# Patient Record
Sex: MALE | Race: WHITE | Employment: UNEMPLOYED | URBAN - METROPOLITAN AREA
[De-identification: names, ages, dates, MRNs, and addresses within clinical notes are randomized per-mention and may not be internally consistent; named-entity substitution may affect disease eponyms.]

---
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
## 2023-02-19 ENCOUNTER — HOSPITAL ENCOUNTER (EMERGENCY)
Facility: CLINIC | Age: 18
Discharge: HOME | End: 2023-02-19
Attending: SPECIALIST
Payer: MEDICAID

## 2023-02-19 VITALS
HEART RATE: 100 BPM | DIASTOLIC BLOOD PRESSURE: 104 MMHG | WEIGHT: 315 LBS | SYSTOLIC BLOOD PRESSURE: 150 MMHG | TEMPERATURE: 98.3 F | OXYGEN SATURATION: 100 % | RESPIRATION RATE: 18 BRPM | BODY MASS INDEX: 56.12 KG/M2

## 2023-02-19 DIAGNOSIS — M79.604 RIGHT LEG PAIN: Primary | ICD-10-CM

## 2023-02-19 PROCEDURE — 99282 EMERGENCY DEPT VISIT SF MDM: CPT

## 2023-02-19 ASSESSMENT — PAIN DESCRIPTION - DESCRIPTORS: DESCRIPTORS: SORE

## 2023-02-19 ASSESSMENT — PAIN DESCRIPTION - PAIN TYPE: TYPE: ACUTE PAIN

## 2023-02-19 ASSESSMENT — PAIN - FUNCTIONAL ASSESSMENT: PAIN_FUNCTIONAL_ASSESSMENT: 0-10

## 2023-02-19 ASSESSMENT — PAIN DESCRIPTION - LOCATION: LOCATION: LEG

## 2023-02-19 ASSESSMENT — PAIN DESCRIPTION - FREQUENCY: FREQUENCY: INTERMITTENT

## 2023-02-19 ASSESSMENT — ENCOUNTER SYMPTOMS: SHORTNESS OF BREATH: 0

## 2023-02-19 ASSESSMENT — PAIN SCALES - GENERAL: PAINLEVEL_OUTOF10: 2

## 2023-02-19 ASSESSMENT — PAIN DESCRIPTION - ORIENTATION: ORIENTATION: RIGHT

## 2023-02-19 NOTE — ED NOTES
Pt. Ambulatory to room # 11 from waiting area, gait steady. Pt. C/o right calf pain only when he presses the area. Pt. Denies cp or sob. Pt. Alert and oriented x4. RR equal and non labored. NaD  Noted. Call light within reach.       Emmanuel Kwong RN  02/19/23 9808

## 2023-02-19 NOTE — ED PROVIDER NOTES
[facility]  [facility]  Phone: [phone]      Pt Name: Emmy Heard  MRN: [id number]  [place] 2005  Date of evaluation: 2/19/2023      CHIEF COMPLAINT       Chief Complaint   Patient presents with    Leg Pain     Back of right calf started yesterday. ? injury         HISTORY OF PRESENT ILLNESS    Emmy Heard is a 21 y.o. male who presents   Chief Complaint   Patient presents with    Leg Pain     Back of right calf started yesterday. ? injury   . 53-year-old male patient presents to the emergency department complaining of pain on the medial aspect of the right lower leg since 1 day prior to arrival.  Patient grades pain is only 2 out of 10 in intensity worse upon palpation in the region. He denies any swelling in the calf pain and denies any chest pain, shortness of breath, fever or chills. He is unsure about noticing any swelling in the right lower leg but has noticed slight redness at the site of the tenderness. No history of fever or chills. Patient is able to ambulate freely without any pain or discomfort. No history of fall or injuries. Patient has taken acetaminophen 1000 mg orally with some relief. Patient states he has habit of keeping himself in the calf area with the other leg. He denies any recent long travels or prolonged immobilization and no history of DVT or PE in the past.  Patient states he is not very active and thus is concerned about blood clot in the leg. REVIEW OF SYSTEMS     Review of Systems   Constitutional:  Negative for chills and fever. Respiratory:  Negative for shortness of breath. Cardiovascular:  Negative for chest pain and palpitations. Musculoskeletal:  Positive for myalgias. Neurological:  Negative for weakness and numbness. All other systems reviewed and are negative. PAST MEDICAL HISTORY    has a past medical history of Depression and Hypertension.     SURGICAL HISTORY      has no past surgical history on file. CURRENT MEDICATIONS       Previous Medications    ATENOLOL PO    Take by mouth    ESCITALOPRAM (LEXAPRO) 10 MG TABLET    Take 10 mg by mouth daily    IBUPROFEN (ADVIL;MOTRIN) 600 MG TABLET    Take 1 tablet by mouth every 6 hours as needed for Pain    METOPROLOL SUCCINATE (TOPROL XL) 50 MG EXTENDED RELEASE TABLET    Take 50 mg by mouth daily    TRAZODONE HCL PO    Take by mouth       ALLERGIES     is allergic to cefdinir and clindamycin/lincomycin. FAMILY HISTORY     has no family status information on file. family history is not on file. SOCIAL HISTORY      reports that he has never smoked. He has never used smokeless tobacco. He reports that he does not currently use alcohol. He reports that he does not use drugs. PHYSICAL EXAM     INITIAL VITALS:  weight is 172.4 kg (380 lb) (abnormal). His temperature is 98.3 °F (36.8 °C). His blood pressure is 150/104 (abnormal) and his pulse is 100. His respiration is 18 and oxygen saturation is 100%. Physical Exam  Vitals and nursing note reviewed. Constitutional:       General: He is not in acute distress. Appearance: He is well-developed. HENT:      Head: Normocephalic and atraumatic. Nose: Nose normal.   Eyes:      Extraocular Movements: Extraocular movements intact. Pupils: Pupils are equal, round, and reactive to light. Cardiovascular:      Rate and Rhythm: Normal rate and regular rhythm. Heart sounds: Normal heart sounds. No murmur heard. Pulmonary:      Effort: Pulmonary effort is normal. No respiratory distress. Breath sounds: Normal breath sounds. Abdominal:      General: Bowel sounds are normal. There is no distension. Palpations: Abdomen is soft. Tenderness: There is no abdominal tenderness. Musculoskeletal:      Cervical back: Normal range of motion and neck supple. Left lower leg: No swelling. No edema.       Comments: Patient has mild tenderness upon palpation on the medial aspect of the right lower leg. There is no edema in the calf and there is no tenderness in the right calf. Dimitry Roula' sign is negative. There is no evidence of erythema whatsoever. Neurovascular examination is intact distally. Achilles tendon is normal and intact. Gait is normal.   Skin:     General: Skin is warm and dry. Neurological:      General: No focal deficit present. Mental Status: He is alert and oriented to person, place, and time. Psychiatric:         Behavior: Behavior normal.         Thought Content: Thought content normal.         DIFFERENTIAL DIAGNOSIS/ MDM:     Differential diagnosis considered: Right lower leg probably musculoskeletal in origin. No evidence of cellulitis or Achilles tendon rupture. There is no calf tenderness, Homans' sign negative. Clinically no evidence of DVT. DIAGNOSTIC RESULTS     EKG: All EKG's are interpreted by the Emergency Department Physician who either signs or Co-signs this chart in the absence of a cardiologist.    None obtained    RADIOLOGY:   I reviewed the radiologist interpretations:  No orders to display       No results found. LABS:  Labs Reviewed - No data to display        EMERGENCY DEPARTMENT COURSE:   Vitals:    Vitals:    02/19/23 0347   BP: (!) 150/104   Pulse: 100   Resp: 18   Temp: 98.3 °F (36.8 °C)   SpO2: 100%   Weight: (!) 172.4 kg (380 lb)     -------------------------  BP: (!) 150/104, Temp: 98.3 °F (36.8 °C), Heart Rate: 100, Resp: 18    No orders of the defined types were placed in this encounter. Patient is ambulatory in the hallway and does not appear to be in any pain or distress. He was reassured and discharged home with instructions to take Tylenol and/or ibuprofen as needed for the pain, elevate the right lower leg, follow-up with PCP, return if symptoms worsen or if he develops any new symptoms including pain or swelling in the right calf, chest pain, shortness of breath, fever or chills.   He is advised to call us if any questions, concerns or problems. I have reviewed the disposition diagnosis with the patient and or their family/guardian. I have answered their questions and given discharge instructions. They voiced understanding of these instructions and did not have any further questions or complaints. Re-evaluation Notes    Patient is resting comfortably and does not appear to be in any pain or distress prior to discharge      PROCEDURES:  None    FINAL IMPRESSION      1. Right leg pain          DISPOSITION/PLAN   DISPOSITION Decision To Discharge 02/19/2023 04:01:27 AM      Condition on Disposition    Stable    PATIENT REFERRED TO:  Kristopher Harmon, [address] [place] [address]  [phone]    Call in 3 days  For reevaluation of current symptoms    [facility] ED  / [place] [address]  [phone]    If symptoms worsen      DISCHARGE MEDICATIONS:  New Prescriptions    No medications on file       (Please note that portions of this note were completed with a voice recognition program.  Efforts were made to edit the dictations but occasionally words are mis-transcribed.)    Junior Sidhu MD,, MD, F.A.C.E.P.   Attending Emergency Physician       Junior Sidhu MD  02/19/23 8716

## 2023-02-19 NOTE — DISCHARGE INSTRUCTIONS
Please understand that at this time there is no evidence for a more serious underlying process, but that early in the process of an illness or injury, an emergency department workup can be falsely reassuring. You should contact your family doctor within the next 48 hours for a follow up appointment    Shyam Burrows!!!    From [facility] ([facility]) and [facility] Emergency Services    On behalf of the Emergency Department staff at [facility]), I would like to thank you for giving us the opportunity to address your health care needs and concerns. We hope that during your visit, our service was delivered in a professional and caring manner. Please keep [facility] ([facility]) in mind as we walk with you down the path to your own personal wellness. Please expect an automated text message or email from us so we can ask a few questions about your health and progress. Based on your answers, a clinician may call you back to offer help and instructions. Please understand that early in the process of an illness or injury, an emergency department workup can be falsely reassuring. If you notice any worsening, changing or persistent symptoms please call your family doctor or return to the ER immediately. Tell us how we did during your visit at [email or web address]   and let us know about your experience

## 2023-03-14 ENCOUNTER — HOSPITAL ENCOUNTER (EMERGENCY)
Facility: CLINIC | Age: 18
Discharge: HOME | End: 2023-03-14
Attending: EMERGENCY MEDICINE
Payer: MEDICAID

## 2023-03-14 VITALS
BODY MASS INDEX: 46.65 KG/M2 | DIASTOLIC BLOOD PRESSURE: 95 MMHG | WEIGHT: 315 LBS | OXYGEN SATURATION: 95 % | RESPIRATION RATE: 16 BRPM | SYSTOLIC BLOOD PRESSURE: 151 MMHG | HEIGHT: 69 IN | TEMPERATURE: 98.3 F | HEART RATE: 88 BPM

## 2023-03-14 DIAGNOSIS — R00.2 PALPITATIONS: Primary | ICD-10-CM

## 2023-03-14 LAB
ABSOLUTE EOS #: 0.1 K/UL (ref 0–0.4)
ABSOLUTE LYMPH #: 2.5 K/UL (ref 1.2–5.2)
ABSOLUTE MONO #: 0.7 K/UL (ref 0.1–1.4)
ANION GAP SERPL CALCULATED.3IONS-SCNC: 8 MMOL/L (ref 9–17)
BASOPHILS # BLD: 1 % (ref 0–2)
BASOPHILS ABSOLUTE: 0.1 K/UL (ref 0–0.2)
BUN SERPL-MCNC: 10 MG/DL (ref 6–20)
CALCIUM SERPL-MCNC: 9.7 MG/DL (ref 8.6–10.4)
CHLORIDE SERPL-SCNC: 99 MMOL/L (ref 98–107)
CO2 SERPL-SCNC: 30 MMOL/L (ref 20–31)
CREAT SERPL-MCNC: 0.7 MG/DL (ref 0.7–1.2)
EOSINOPHILS RELATIVE PERCENT: 1 % (ref 1–4)
GFR SERPL CREATININE-BSD FRML MDRD: >60 ML/MIN/1.73M2
GLUCOSE SERPL-MCNC: 101 MG/DL (ref 70–99)
HCT VFR BLD AUTO: 44.3 % (ref 41–53)
HGB BLD-MCNC: 15 G/DL (ref 13.5–17.5)
LYMPHOCYTES # BLD: 27 % (ref 25–45)
MCH RBC QN AUTO: 29.6 PG (ref 26–34)
MCHC RBC AUTO-ENTMCNC: 33.9 G/DL (ref 31–37)
MCV RBC AUTO: 87.3 FL (ref 80–100)
MONOCYTES # BLD: 7 % (ref 2–8)
PDW BLD-RTO: 12.9 % (ref 12.5–15.4)
PLATELET # BLD AUTO: 331 K/UL (ref 140–450)
PMV BLD AUTO: 8.8 FL (ref 6–12)
POTASSIUM SERPL-SCNC: 3.6 MMOL/L (ref 3.7–5.3)
RBC # BLD: 5.07 M/UL (ref 4.5–5.9)
SEG NEUTROPHILS: 64 % (ref 34–64)
SEGMENTED NEUTROPHILS ABSOLUTE COUNT: 5.9 K/UL (ref 1.8–8)
SODIUM SERPL-SCNC: 137 MMOL/L (ref 135–144)
TROPONIN I SERPL DL<=0.01 NG/ML-MCNC: <6 NG/L (ref 0–22)
WBC # BLD AUTO: 9.2 K/UL (ref 4.5–13.5)

## 2023-03-14 PROCEDURE — 93005 ELECTROCARDIOGRAM TRACING: CPT | Performed by: REGISTERED NURSE

## 2023-03-14 PROCEDURE — 80048 BASIC METABOLIC PNL TOTAL CA: CPT

## 2023-03-14 PROCEDURE — 85025 COMPLETE CBC W/AUTO DIFF WBC: CPT

## 2023-03-14 PROCEDURE — 36415 COLL VENOUS BLD VENIPUNCTURE: CPT

## 2023-03-14 PROCEDURE — 99284 EMERGENCY DEPT VISIT MOD MDM: CPT

## 2023-03-14 PROCEDURE — 84484 ASSAY OF TROPONIN QUANT: CPT

## 2023-03-14 ASSESSMENT — ENCOUNTER SYMPTOMS
VOMITING: 0
NAUSEA: 0
DIARRHEA: 0
COUGH: 0
ABDOMINAL PAIN: 0
SHORTNESS OF BREATH: 0

## 2023-03-14 ASSESSMENT — PAIN - FUNCTIONAL ASSESSMENT: PAIN_FUNCTIONAL_ASSESSMENT: NONE - DENIES PAIN

## 2023-03-14 NOTE — ED NOTES
Patient to ED via self to room 5  Here for complaint of palpitations with anxiety  Patient states this has been ongoing for the last two days or so   Patient states that he has a hx of anxiety as well as high blood pressure. Patient has had palpitations in the past and this feels similar.  Patient states he does not feel as if he is very healthy which is why he is concerned about his heart  Denies chest pain, but states it feels uncomfortable  Denies SI/HI    Vitals obtained and call light provided  Patient resting comfortably on stretcher in no apparent distress  Respirations even and non-labored  Provider at bedside for evaluation     Francy Mckinnon RN  03/14/23 1436

## 2023-03-14 NOTE — DISCHARGE INSTRUCTIONS
Please understand that at this time there is no evidence for a more serious underlying process, but that early in the process of an illness or injury, an emergency department workup can be falsely reassuring. You should contact your family doctor within the next 48 hours for a follow up appointment    Shyam Burrows!!!    From [address] and [facility] Emergency Services    On behalf of the Emergency Department staff at [address], I would like to thank you for giving us the opportunity to address your health care needs and concerns. We hope that during your visit, our service was delivered in a professional and caring manner. Please keep [address] in mind as we walk with you down the path to your own personal wellness. Please expect an automated text message or email from us so we can ask a few questions about your health and progress. Based on your answers, a clinician may call you back to offer help and instructions. Please understand that early in the process of an illness or injury, an emergency department workup can be falsely reassuring. If you notice any worsening, changing or persistent symptoms please call your family doctor or return to the ER immediately. Tell us how we did during your visit at [email or web address]   and let us know about your experience

## 2023-03-14 NOTE — ED PROVIDER NOTES
Emergency Department           COMPLAINT       Chief Complaint   Patient presents with    Palpitations           Anxiety      PHYSICAL EXAM      ED Triage Vitals [03/14/23 1231]   BP Temp Temp Source Heart Rate Resp SpO2 Height Weight - Scale   (!) 151/95 98.3 °F (36.8 °C) Oral 88 16 95 % 5' 9\" (1.753 m) (!) 389 lb (176.4 kg)       Constitutional: Alert, oriented x3, nontoxic, answering questions appropriately, acting properly for age, in no acute distress   HEENT: Extraocular muscles intact, pupils equal round reactive to light  Neck: Trachea midline   Cardiovascular: Regular rhythm and rate no murmurs   Respiratory: Clear to auscultation bilaterally no wheezes, rhonchi, rales, no respiratory distress no tachypnea no retractions no hypoxia  Gastrointestinal: Soft, nontender, nondistended, positive bowel sounds. No rebound, rigidity, or guarding. Musculoskeletal: No extremity pain or swelling   Neurologic: Moving all 4 extremities without difficulty there are no gross focal neurologic deficits   Skin: Warm and dry         Physical Exam  DIAGNOSTIC RESULTS     EKG: All EKG's are interpreted by the Emergency Department Physician who either signs or Co-signs this chart in the absence of a cardiologist.    1229 sinus rhythm rate 74  QRS 90  no acute ST or T wave changes.     Not indicated unless otherwise documented above or in the midlevel documentation    LABS:  Results for orders placed or performed during the hospital encounter of 03/14/23   CBC with Auto Differential   Result Value Ref Range    WBC 9.2 4.5 - 13.5 k/uL    RBC 5.07 4.5 - 5.9 m/uL    Hemoglobin 15.0 13.5 - 17.5 g/dL    Hematocrit 44.3 41 - 53 %    MCV 87.3 80 - 100 fL    MCH 29.6 26 - 34 pg    MCHC 33.9 31 - 37 g/dL    RDW 12.9 12.5 - 15.4 %    Platelets 435 054 - 349 k/uL    MPV 8.8 6.0 - 12.0 fL    Seg Neutrophils 64 34 - 64 %    Lymphocytes 27 25 - 45 %    Monocytes 7 2 - 8 %    Eosinophils % 1 1 - 4 %    Basophils 1 0 - 2 %    Segs Absolute 5.90 1.8 - 8.0 k/uL    Absolute Lymph # 2.50 1.2 - 5.2 k/uL    Absolute Mono # 0.70 0.1 - 1.4 k/uL    Absolute Eos # 0.10 0.0 - 0.4 k/uL    Basophils Absolute 0.10 0.0 - 0.2 k/uL   Basic Metabolic Panel   Result Value Ref Range    Glucose 101 (H) 70 - 99 mg/dL    BUN 10 6 - 20 mg/dL    Creatinine 0.70 0.70 - 1.20 mg/dL    Est, Glom Filt Rate >60 >60 mL/min/1.73m2    Calcium 9.7 8.6 - 10.4 mg/dL    Sodium 137 135 - 144 mmol/L    Potassium 3.6 (L) 3.7 - 5.3 mmol/L    Chloride 99 98 - 107 mmol/L    CO2 30 20 - 31 mmol/L    Anion Gap 8 (L) 9 - 17 mmol/L   Troponin   Result Value Ref Range    Troponin, High Sensitivity <6 0 - 22 ng/L   EKG 12 Lead   Result Value Ref Range    Ventricular Rate 74 BPM    Atrial Rate 74 BPM    P-R Interval 164 ms    QRS Duration 90 ms    Q-T Interval 360 ms    QTc Calculation (Bazett) 399 ms    P Axis 35 degrees    R Axis 64 degrees    T Axis 24 degrees       Not indicated unless otherwise documented above or in the midlevel documentation    RADIOLOGY:   I reviewedthe radiologist interpretations:  No orders to display       Not indicated unless otherwise documented above or in the midlevel documentation    EMERGENCY DEPARTMENT COURSE:     The patient was given the following medications:  No orders of the defined types were placed in this encounter. PERTINENT ATTENDING PHYSICIAN COMMENTS:    History of anxiety palpitations yesterday no associated lightheadedness or dizziness. Has a history of palpitations. No chest pain admits to chronic shortness of breath because of his weight. He says he ate some food that he should not have and then he started to feel the palpitations. He had an episode today as well. Never had a Holter monitor. No fevers or chills cough or congestion. We will check basic labs and EKG. 1:45 PM normal CBC normal electrolytes and kidney function. Normal troponin. Low suspicion for cardiac etiology.   May need a Holter monitor if symptoms persist or worsen. Return if any other concerns. Faculty Attestation    I performed a history and physical examination of the patient and discussed management with the mid level provideer. I reviewed the mid level provider's note and agree with the documented findings and plan of care. Any areas of disagreement are noted on the chart. I was personally present for the key portions of any procedures. I have documented in the chart those procedures where I was not present during the key portions. I have reviewed the emergency nurses triage note. I agree with the chief complaint, past medical history, past surgical history, allergies, medications, social and family history as documented unless otherwise noted below. Documentation of the HPI, Physical Exam and Medical Decision Making performed by medical students or scribes is based on my personal performance of the HPI, PE and MDM. For Physician Assistant/ Nurse Practitioner cases/documentation I have personally evaluated this patient and have completed at least one if not all key elements of the E/M (history, physical exam, and MDM). Additional findings are as noted.       Kristal Bone, DO  03/14/23 3852

## 2023-03-15 LAB
EKG ATRIAL RATE: 74 BPM
EKG P AXIS: 35 DEGREES
EKG P-R INTERVAL: 164 MS
EKG Q-T INTERVAL: 360 MS
EKG QRS DURATION: 90 MS
EKG QTC CALCULATION (BAZETT): 399 MS
EKG R AXIS: 64 DEGREES
EKG T AXIS: 24 DEGREES
EKG VENTRICULAR RATE: 74 BPM

## 2023-03-15 NOTE — ED PROVIDER NOTES
[facility]  [address] [facility]  Phone: [phone]        Pt Name: Mariana Cano  MRN: [id number]  [place] 2005  Date of evaluation: 3/14/23    CHIEFCOMPLAINT       Chief Complaint   Patient presents with    Palpitations           Anxiety       HISTORY OF PRESENT ILLNESS (Location/Symptom, Timing/Onset, Context/Setting, Quality, Duration, Modifying Factors, Severity)      Mariana Cano is a 21 y.o. male with no pertinent PMH who presents to the ED via private auto with intermittent palpitations ongoing for last 2 days. Patient states he has a history of anxiety and high blood pressure is unsure if this is attributing to his symptoms. Patient states he is felt his heart \"beat\" intermittently over the last few days which had caused his anxiety to rise. He denies any difficulty breathing, cough, shortness of breath, abdominal pain nausea vomiting or diarrhea. He is not taking medications for symptoms. He states that nothing makes his symptoms better at this time. On arrival he is resting on the cot comfortably with even unlabored breaths is nontoxic-appearing with no acute distress noted. PAST MEDICAL / SURGICAL / SOCIAL / FAMILY HISTORY     PMH:  has a past medical history of Depression and Hypertension. Surgical History:  has no past surgical history on file. Social History:  reports that he has never smoked. He has never used smokeless tobacco. He reports that he does not currently use alcohol. He reports that he does not use drugs. Family History: has no family status information on file. family history is not on file. Psychiatric History: None    Allergies: Cefdinir and Clindamycin/lincomycin    Home Medications:   Prior to Admission medications    Medication Sig Start Date End Date Taking?  Authorizing Provider   TRAZODONE HCL PO Take by mouth    Historical Provider, MD   metoprolol succinate (TOPROL XL) 50 MG extended release tablet Take 50 mg by mouth daily    Historical Provider, MD   escitalopram (LEXAPRO) 10 MG tablet Take 10 mg by mouth daily    Historical Provider, MD   ATENOLOL PO Take by mouth    Historical Provider, MD   ibuprofen (ADVIL;MOTRIN) 600 MG tablet Take 1 tablet by mouth every 6 hours as needed for Pain 3/7/20   Lyly Jacobs,        REVIEW OF SYSTEMS  (2-9 systems for level 4, 10 ormore for level 5)      Review of Systems   Constitutional:  Negative for chills and fever. Respiratory:  Negative for cough and shortness of breath. Cardiovascular:  Positive for palpitations. Negative for chest pain. Gastrointestinal:  Negative for abdominal pain, diarrhea, nausea and vomiting. Neurological:  Negative for dizziness and headaches. All other systems negative except as marked. PHYSICAL EXAM  (up to 7 for level 4, 8 or more for level 5)      INITIAL VITALS:  height is 5' 9\" (1.753 m) and weight is 176.4 kg (389 lb) (abnormal). His oral temperature is 98.3 °F (36.8 °C). His blood pressure is 151/95 (abnormal) and his pulse is 88. His respiration is 16 and oxygen saturation is 95%. Vital signs reviewed. Physical Exam  Constitutional:       General: He is not in acute distress. Appearance: Normal appearance. He is obese. He is not ill-appearing or toxic-appearing. HENT:      Head: Normocephalic and atraumatic. Neck:      Vascular: No JVD. Trachea: No tracheal deviation. Cardiovascular:      Rate and Rhythm: Normal rate and regular rhythm. Pulses: Normal pulses. Heart sounds: Normal heart sounds. Pulmonary:      Effort: Pulmonary effort is normal.      Breath sounds: Normal breath sounds. Abdominal:      General: Abdomen is flat. There is no distension. Palpations: Abdomen is soft. There is no mass. Tenderness: There is no abdominal tenderness. There is no guarding or rebound. Hernia: No hernia is present. Musculoskeletal:      Cervical back: Neck supple.    Skin:     General: Skin is warm and dry. Capillary Refill: Capillary refill takes less than 2 seconds. Neurological:      Mental Status: He is alert. Psychiatric:         Mood and Affect: Mood normal.         Behavior: Behavior normal.         DIFFERENTIAL DIAGNOSIS / MDM     Differential diagnosis considered: ACS, palpitations, cardiac arrhythmia, anxiety/stress    Chronic Conditions affecting care (DM,HTN,CA, etc): Anxiety, obesity, hypertension    Social Determinants of Health affecting care (unable to care for self, lives alone, unemployed, homeless,etc): None    History source(s) (patient,spouse,parent,family,friend,EMS,etc): Self    Review of external sources (ECF,Hospital records,EMS report, radiology reports, etc): Hospital Records, Radiology Reports    Tests considered but not ordered: Not applicable    Independent interpretation of tests (eg.  X-ray, CAT scan, Doppler studies, EKG): See Below    Discussion of x-ray results with radiology: See Below    Consults: See Below    Consideration for admission/observation (even if discharged): Considered for admission pending workup. Prescription considerations: See Below    Sepsis considered: Yes    Critical Care note written: See below    CBC and BMP unremarkable. Troponin is negative. EKG shows no acute abnormalities. I believe patient symptoms are related to anxiety/stress. Instructed patient to follow-up with PCP within 1 day, will provide patient with cardiology information for possible Holter monitor. Instructed patient return with any chest pain, shortness of breath, difficult breathing or continued symptoms. Patient is agreement this plan at this time. All question concerns were answered at this time. The patient presents with chest pain that is not suggesting in nature of pulmonary embolus, aortic dissection, cardiac ischemia, or other serious etiology.  I considered an aortic dissection, but this is unlikely as patient is not complaining of tearing or ripping chest pain that is radiating to the back, the patient has no new neurological abnormalities and pulses are equal to all extremities. Mediastinum is within normal limits. Patient appears comfortable on physical exam and is not in distress. I also thought about a cardiac tamponade, but this is unlikely as patient is hemodynamically stable. Heart sounds are not distant, EKG does not show signs of electrical alternans and there is no JVD. I also thought about a tension pneumothorax, but this is unlikely given bilateral breath sounds and no signs of hemodynamic instability. I do not feel the patient has a PE. No clinical evidence of DVT. I thought about an esophageal perforation, but history and physical exam does not suggest vomiting, followed by chest pain. No signs of Hamman's crunch on physical exam; again, patient appears comfortable and is well appearing and non toxic. The patient has been instructed to return if the symptpoms change or worsen in any way. Given the extremely low risk of these diagnoses further testing and evaluation for these possibilites are not indicated at this time. The patient appears stable for discharge and has been instructed to return immediately if the symptoms worsen in any way, or in 8-12 hr if not improved for re-evaluation. We also discussed returning to the Emergency Department immediately if new or worsening symptoms occur. We have discussed the symptoms which are most concerning (e.g., worsening pain, shortness of breath, a feeling of passing out, fever, any neurologic symptoms, abdominal pain or vomiting) that necessitate immediate return. The patient understands that at this time there is no evidence for a more malignant underlying process, but the patient also understands that early in the process of an illness or injury, an emergency department workup can be falsely reassuring.   Routine discharge counseling was given, and the patient understands that worsening, changing or persistent symptoms should prompt an immediate call or follow up with their primary physician or return to the emergency department. The importance of appropriate follow up was also discussed. I have reviewed the disposition diagnosis with the patient and or their family/guardian. I have answered their questions and given discharge instructions. They voiced understanding of these instructions and did not have any further questions or complaints. PLAN (LABS / IMAGING / EKG):  Orders Placed This Encounter   Procedures    CBC with Auto Differential    Basic Metabolic Panel    Troponin    EKG 12 Lead       MEDICATIONS ORDERED:  No orders of the defined types were placed in this encounter. Controlled Substances Monitoring:     DIAGNOSTIC RESULTS     EKG: All EKG's are interpreted by the Emergency Department Physician who either signs or Co-signs this chart in the absenceof a cardiologist.        RADIOLOGY: All images are read by the radiologist and their interpretations are reviewed. No orders to display       No results found.     LABS:  Results for orders placed or performed during the hospital encounter of 03/14/23   CBC with Auto Differential   Result Value Ref Range    WBC 9.2 4.5 - 13.5 k/uL    RBC 5.07 4.5 - 5.9 m/uL    Hemoglobin 15.0 13.5 - 17.5 g/dL    Hematocrit 44.3 41 - 53 %    MCV 87.3 80 - 100 fL    MCH 29.6 26 - 34 pg    MCHC 33.9 31 - 37 g/dL    RDW 12.9 12.5 - 15.4 %    Platelets 224 949 - 655 k/uL    MPV 8.8 6.0 - 12.0 fL    Seg Neutrophils 64 34 - 64 %    Lymphocytes 27 25 - 45 %    Monocytes 7 2 - 8 %    Eosinophils % 1 1 - 4 %    Basophils 1 0 - 2 %    Segs Absolute 5.90 1.8 - 8.0 k/uL    Absolute Lymph # 2.50 1.2 - 5.2 k/uL    Absolute Mono # 0.70 0.1 - 1.4 k/uL    Absolute Eos # 0.10 0.0 - 0.4 k/uL    Basophils Absolute 0.10 0.0 - 0.2 k/uL   Basic Metabolic Panel   Result Value Ref Range    Glucose 101 (H) 70 - 99 mg/dL    BUN 10 6 - 20 mg/dL    Creatinine 0. 70 0.70 - 1.20 mg/dL    Est, Glom Filt Rate >60 >60 mL/min/1.73m2    Calcium 9.7 8.6 - 10.4 mg/dL    Sodium 137 135 - 144 mmol/L    Potassium 3.6 (L) 3.7 - 5.3 mmol/L    Chloride 99 98 - 107 mmol/L    CO2 30 20 - 31 mmol/L    Anion Gap 8 (L) 9 - 17 mmol/L   Troponin   Result Value Ref Range    Troponin, High Sensitivity <6 0 - 22 ng/L   EKG 12 Lead   Result Value Ref Range    Ventricular Rate 74 BPM    Atrial Rate 74 BPM    P-R Interval 164 ms    QRS Duration 90 ms    Q-T Interval 360 ms    QTc Calculation (Bazett) 399 ms    P Axis 35 degrees    R Axis 64 degrees    T Axis 24 degrees       EMERGENCY DEPARTMENT COURSE           Vitals:    Vitals:    03/14/23 1231   BP: (!) 151/95   Pulse: 88   Resp: 16   Temp: 98.3 °F (36.8 °C)   TempSrc: Oral   SpO2: 95%   Weight: (!) 176.4 kg (389 lb)   Height: 5' 9\" (1.753 m)     -------------------------  BP: (!) 151/95, Temp: 98.3 °F (36.8 °C), Heart Rate: 88, Resp: 16      RE-EVALUATION:  See ED Course notes above. CONSULTS:  None    PROCEDURES:  None    FINAL IMPRESSION      1. Palpitations          DISPOSITION / PLAN     CONDITION ON DISPOSITION:   Stable for discharge.      PATIENT REFERRED TO:  Tj Moody MD  [address] [place] [address] [phone]    Call in 1 day      [facility]  / [place] [address]  [phone]    If symptoms worsen    [facility]  [address] [place] [address] [address] [phone]  Call in 1 day      DISCHARGE MEDICATIONS:  Discharge Medication List as of 3/14/2023  1:44 PM          SUSAN Holt CNP   Emergency Medicine Nurse Practitioner    (Please note that portions of this note were completed with a voice recognition program.  Efforts were made to edit the dictations but occasionally words aremis-transcribed.)       SUSAN Holt CNP  03/14/23 [id number]

## 2023-10-12 ENCOUNTER — HOSPITAL ENCOUNTER (EMERGENCY)
Facility: CLINIC | Age: 18
Discharge: HOME | End: 2023-10-13
Attending: EMERGENCY MEDICINE
Payer: MEDICAID

## 2023-10-12 VITALS
TEMPERATURE: 99 F | OXYGEN SATURATION: 98 % | HEART RATE: 72 BPM | RESPIRATION RATE: 16 BRPM | DIASTOLIC BLOOD PRESSURE: 88 MMHG | HEIGHT: 68 IN | SYSTOLIC BLOOD PRESSURE: 150 MMHG | BODY MASS INDEX: 47.74 KG/M2 | WEIGHT: 315 LBS

## 2023-10-12 DIAGNOSIS — G56.02 LEFT CARPAL TUNNEL SYNDROME: ICD-10-CM

## 2023-10-12 DIAGNOSIS — R00.2 HEART PALPITATIONS: ICD-10-CM

## 2023-10-12 DIAGNOSIS — F41.1 ANXIETY STATE: Primary | ICD-10-CM

## 2023-10-12 PROCEDURE — 99282 EMERGENCY DEPT VISIT SF MDM: CPT

## 2023-10-12 ASSESSMENT — LIFESTYLE VARIABLES
HOW MANY STANDARD DRINKS CONTAINING ALCOHOL DO YOU HAVE ON A TYPICAL DAY: PATIENT DOES NOT DRINK
HOW OFTEN DO YOU HAVE A DRINK CONTAINING ALCOHOL: NEVER

## 2023-10-13 LAB
EKG ATRIAL RATE: 69 BPM
EKG P AXIS: 30 DEGREES
EKG P-R INTERVAL: 168 MS
EKG Q-T INTERVAL: 356 MS
EKG QRS DURATION: 80 MS
EKG QTC CALCULATION (BAZETT): 381 MS
EKG R AXIS: 32 DEGREES
EKG T AXIS: 11 DEGREES
EKG VENTRICULAR RATE: 69 BPM

## 2023-10-13 RX ORDER — BUSPIRONE HYDROCHLORIDE 15 MG/1
1 TABLET ORAL 2 TIMES DAILY
Qty: 60 TABLET | Refills: 0 | COMMUNITY
Start: 2023-09-27 | End: 2023-10-27

## 2023-10-13 ASSESSMENT — PAIN SCALES - GENERAL: PAINLEVEL_OUTOF10: 2

## 2023-10-13 ASSESSMENT — PAIN DESCRIPTION - ORIENTATION: ORIENTATION: LEFT

## 2023-10-13 ASSESSMENT — PAIN DESCRIPTION - LOCATION: LOCATION: WRIST

## 2023-10-13 NOTE — DISCHARGE INSTRUCTIONS
Take your medication as indicated and prescribed. For pain use acetaminophen (Tylenol) or ibuprofen (Motrin / Advil), unless prescribed medications that have acetaminophen or ibuprofen (or similar medications) in it. You can take over the counter acetaminophen tablets (1 - 2 tablets of the 500-mg strength every 6 hours) or ibuprofen tablets (2 tablets every 4 hours). Try applying an ice pack  to the injured area 3 - 4 times a day for 15 - 20 minutes each time. You can also try to use a heating pad to help relax the muscles. PLEASE RETURN TO THE EMERGENCY DEPARTMENT IMMEDIATELY for worsening of pain, decrease sensation to arms or legs, inability to move arms or legs, shortness of breath, severe chest pain, excessive nausea or vomiting, notice any bruising to your abdomen or have increase in abdominal pain, or if you develop any concerning symptoms such as: high fever not relieved by acetaminophen (Tylenol) and/or ibuprofen (Motrin / Advil), chills, feeling of your heart fluttering or racing, persistent nausea and/or vomiting, vomiting up blood, blood in your stool, loss of consciousness, numbness, weakness or tingling in the arms or legs or change in color of the extremities, changes in mental status, persistent headache, blurry vision, loss of bladder / bowel control, unable to follow up with your physician, or other any other care or concern.

## 2023-10-13 NOTE — ED TRIAGE NOTES
States has been having pain to left wrist which feels like his veins are \"pinching\". Denies injury or previous problems with left wrist.  Good PMS noted. No deformity or skin discoloration. Pt states he had palpitations yesterday, 2-3 intermittent, short episodes. Denies  chest pain yesterday or today. Pt also states he may be dehydrated as he has had decreased oral intake.

## 2024-01-08 ENCOUNTER — HOSPITAL ENCOUNTER (EMERGENCY)
Facility: CLINIC | Age: 19
Discharge: HOME | End: 2024-01-08
Attending: EMERGENCY MEDICINE
Payer: MEDICAID

## 2024-01-08 VITALS
OXYGEN SATURATION: 98 % | HEIGHT: 68 IN | RESPIRATION RATE: 18 BRPM | BODY MASS INDEX: 47.74 KG/M2 | SYSTOLIC BLOOD PRESSURE: 124 MMHG | DIASTOLIC BLOOD PRESSURE: 77 MMHG | HEART RATE: 98 BPM | WEIGHT: 315 LBS | TEMPERATURE: 98.4 F

## 2024-01-08 DIAGNOSIS — R00.2 PALPITATIONS: Primary | ICD-10-CM

## 2024-01-08 LAB
ALBUMIN SERPL-MCNC: 4.1 G/DL (ref 3.5–5.2)
ALBUMIN/GLOB SERPL: 1.4 {RATIO} (ref 1–2.5)
ALP SERPL-CCNC: 62 U/L (ref 40–129)
ALT SERPL-CCNC: 15 U/L (ref 5–41)
ANION GAP SERPL CALCULATED.3IONS-SCNC: 9 MMOL/L (ref 9–17)
AST SERPL-CCNC: 16 U/L
BASOPHILS # BLD: 0.1 K/UL (ref 0–0.2)
BASOPHILS NFR BLD: 1 % (ref 0–2)
BILIRUB SERPL-MCNC: 0.6 MG/DL (ref 0.3–1.2)
BUN SERPL-MCNC: 7 MG/DL (ref 6–20)
CALCIUM SERPL-MCNC: 9.7 MG/DL (ref 8.6–10.4)
CHLORIDE SERPL-SCNC: 100 MMOL/L (ref 98–107)
CO2 SERPL-SCNC: 28 MMOL/L (ref 20–31)
CREAT SERPL-MCNC: 0.7 MG/DL (ref 0.7–1.2)
D DIMER PPP FEU-MCNC: <0.19 UG/ML FEU
EOSINOPHIL # BLD: 0.1 K/UL (ref 0–0.4)
EOSINOPHILS RELATIVE PERCENT: 1 % (ref 1–4)
ERYTHROCYTE [DISTWIDTH] IN BLOOD BY AUTOMATED COUNT: 12.7 % (ref 12.5–15.4)
GFR SERPL CREATININE-BSD FRML MDRD: >60 ML/MIN/1.73M2
GLUCOSE SERPL-MCNC: 95 MG/DL (ref 70–99)
HCT VFR BLD AUTO: 46.5 % (ref 41–53)
HGB BLD-MCNC: 15.9 G/DL (ref 13.5–17.5)
LYMPHOCYTES NFR BLD: 1.4 K/UL (ref 1.2–5.2)
LYMPHOCYTES RELATIVE PERCENT: 20 % (ref 25–45)
MAGNESIUM SERPL-MCNC: 1.8 MG/DL (ref 1.6–2.6)
MCH RBC QN AUTO: 29.5 PG (ref 26–34)
MCHC RBC AUTO-ENTMCNC: 34.1 G/DL (ref 31–37)
MCV RBC AUTO: 86.4 FL (ref 80–100)
MONOCYTES NFR BLD: 0.5 K/UL (ref 0.1–1.4)
MONOCYTES NFR BLD: 7 % (ref 2–8)
NEUTROPHILS NFR BLD: 71 % (ref 34–64)
NEUTS SEG NFR BLD: 5.1 K/UL (ref 1.8–8)
PLATELET # BLD AUTO: 321 K/UL (ref 140–450)
PMV BLD AUTO: 8.6 FL (ref 6–12)
POTASSIUM SERPL-SCNC: 3.8 MMOL/L (ref 3.7–5.3)
PROT SERPL-MCNC: 7 G/DL (ref 6.4–8.3)
RBC # BLD AUTO: 5.38 M/UL (ref 4.5–5.9)
SODIUM SERPL-SCNC: 137 MMOL/L (ref 135–144)
TROPONIN I SERPL HS-MCNC: <6 NG/L (ref 0–22)
WBC OTHER # BLD: 7.2 K/UL (ref 4.5–13.5)

## 2024-01-08 PROCEDURE — 83735 ASSAY OF MAGNESIUM: CPT

## 2024-01-08 PROCEDURE — 85025 COMPLETE CBC W/AUTO DIFF WBC: CPT

## 2024-01-08 PROCEDURE — 93005 ELECTROCARDIOGRAM TRACING: CPT | Performed by: EMERGENCY MEDICINE

## 2024-01-08 PROCEDURE — 80053 COMPREHEN METABOLIC PANEL: CPT

## 2024-01-08 PROCEDURE — 84484 ASSAY OF TROPONIN QUANT: CPT

## 2024-01-08 PROCEDURE — 99284 EMERGENCY DEPT VISIT MOD MDM: CPT

## 2024-01-08 PROCEDURE — 85379 FIBRIN DEGRADATION QUANT: CPT

## 2024-01-08 PROCEDURE — 36415 COLL VENOUS BLD VENIPUNCTURE: CPT

## 2024-01-08 ASSESSMENT — ENCOUNTER SYMPTOMS
CHEST TIGHTNESS: 1
SHORTNESS OF BREATH: 1
ABDOMINAL PAIN: 1
DIARRHEA: 1
VOMITING: 1

## 2024-01-08 ASSESSMENT — PAIN - FUNCTIONAL ASSESSMENT: PAIN_FUNCTIONAL_ASSESSMENT: NONE - DENIES PAIN

## 2024-01-09 LAB
EKG ATRIAL RATE: 82 BPM
EKG P AXIS: 17 DEGREES
EKG P-R INTERVAL: 158 MS
EKG Q-T INTERVAL: 354 MS
EKG QRS DURATION: 90 MS
EKG QTC CALCULATION (BAZETT): 413 MS
EKG R AXIS: 17 DEGREES
EKG T AXIS: 8 DEGREES
EKG VENTRICULAR RATE: 82 BPM

## 2024-01-09 NOTE — ED PROVIDER NOTES
[facility]  [address] [facility] [address]  Phone: [phone]        [facility] ED  EMERGENCY DEPARTMENT ENCOUNTER      Pt Name: Josesito Ocasio  MRN: [id number]  Birthdate 2005  Date of evaluation: 1/8/2024  Provider: Maddy Flower MD    CHIEF COMPLAINT       Chief Complaint   Patient presents with    Palpitations     Has been going on for a while. But pt states getting more common.     Shortness of Breath    Anxiety         HISTORY OF PRESENT ILLNESS   (Location/Symptom, Timing/Onset,Context/Setting, Quality, Duration, Modifying Factors, Severity)  Note limiting factors.   Josesito Ocasio is a 18 y.o. male who presents to the emergency department patient is 18 years old and has a history of palpitations and high blood pressure for which he takes metoprolol.  He states that lately when he gets up too fast he feels that his heartbeat gets very hard and even when he exerts himself his heartbeat beats hard the last time this occurred was yesterday it has been going on for 1 week.  Had nausea and vomited twice.  Diarrhea once.  States that his lower chest upper abdominal area seemed to be uncomfortable.  He denies any overt caffeine use or changes in his medications or dietary habits.  Had no fevers chills cough no UTI complaints.    He is not diabetic he does not smoke cigarettes or marijuana denies any alcohol use.  Had no abdominal surgery.  History of hypertension and palpitations for which she takes metoprolol.  Dates he had an echo in the past.  Allergy to penicillin and amoxicillin.    Nursing Notes were reviewed.    REVIEW OF SYSTEMS    (2-9systems for level 4, 10 or more for level 5)     Review of Systems   Constitutional: Negative.    HENT: Negative.     Respiratory:  Positive for chest tightness and shortness of breath.    Cardiovascular:  Positive for palpitations.   Gastrointestinal:  Positive for abdominal pain, diarrhea and vomiting.   Genitourinary: Negative.

## 2024-01-09 NOTE — DISCHARGE INSTRUCTIONS
Continue your current medications your family doctor arrange close follow-up and reevaluation.  Return if symptoms worsen.

## 2024-02-29 ENCOUNTER — HOSPITAL ENCOUNTER (EMERGENCY)
Facility: CLINIC | Age: 19
Discharge: HOME | End: 2024-02-29
Attending: EMERGENCY MEDICINE
Payer: MEDICAID

## 2024-02-29 ENCOUNTER — APPOINTMENT (OUTPATIENT)
Dept: ULTRASOUND IMAGING | Facility: CLINIC | Age: 19
End: 2024-02-29
Payer: MEDICAID

## 2024-02-29 VITALS
BODY MASS INDEX: 47.74 KG/M2 | HEIGHT: 68 IN | HEART RATE: 87 BPM | RESPIRATION RATE: 17 BRPM | OXYGEN SATURATION: 97 % | SYSTOLIC BLOOD PRESSURE: 142 MMHG | WEIGHT: 315 LBS | DIASTOLIC BLOOD PRESSURE: 63 MMHG | TEMPERATURE: 98.6 F

## 2024-02-29 DIAGNOSIS — R10.11 RIGHT UPPER QUADRANT ABDOMINAL PAIN: Primary | ICD-10-CM

## 2024-02-29 LAB
ALBUMIN SERPL-MCNC: 4.5 G/DL (ref 3.5–5.2)
ALBUMIN/GLOB SERPL: 1.7 {RATIO} (ref 1–2.5)
ALP SERPL-CCNC: 66 U/L (ref 40–129)
ALT SERPL-CCNC: 14 U/L (ref 5–41)
AMYLASE SERPL-CCNC: 58 U/L (ref 28–100)
ANION GAP SERPL CALCULATED.3IONS-SCNC: 9 MMOL/L (ref 9–17)
AST SERPL-CCNC: 14 U/L
BACTERIA URNS QL MICRO: ABNORMAL
BASOPHILS # BLD: 0.1 K/UL (ref 0–0.2)
BASOPHILS NFR BLD: 1 % (ref 0–2)
BILIRUB DIRECT SERPL-MCNC: 0.1 MG/DL
BILIRUB INDIRECT SERPL-MCNC: 0.5 MG/DL (ref 0–1)
BILIRUB SERPL-MCNC: 0.6 MG/DL (ref 0.3–1.2)
BILIRUB UR QL STRIP: ABNORMAL
BUN SERPL-MCNC: 10 MG/DL (ref 6–20)
CALCIUM SERPL-MCNC: 9.7 MG/DL (ref 8.6–10.4)
CHARACTER UR: ABNORMAL
CHLORIDE SERPL-SCNC: 99 MMOL/L (ref 98–107)
CLARITY UR: CLEAR
CO2 SERPL-SCNC: 29 MMOL/L (ref 20–31)
COLOR UR: YELLOW
CREAT SERPL-MCNC: 0.7 MG/DL (ref 0.7–1.2)
EOSINOPHIL # BLD: 0.1 K/UL (ref 0–0.4)
EOSINOPHILS RELATIVE PERCENT: 1 % (ref 1–4)
EPI CELLS #/AREA URNS HPF: ABNORMAL /HPF (ref 0–5)
ERYTHROCYTE [DISTWIDTH] IN BLOOD BY AUTOMATED COUNT: 13.3 % (ref 12.5–15.4)
GFR SERPL CREATININE-BSD FRML MDRD: >60 ML/MIN/1.73M2
GLUCOSE SERPL-MCNC: 97 MG/DL (ref 70–99)
GLUCOSE UR STRIP-MCNC: NEGATIVE MG/DL
HCT VFR BLD AUTO: 46 % (ref 41–53)
HGB BLD-MCNC: 16 G/DL (ref 13.5–17.5)
HGB UR QL STRIP.AUTO: NEGATIVE
KETONES UR STRIP-MCNC: ABNORMAL MG/DL
LEUKOCYTE ESTERASE UR QL STRIP: NEGATIVE
LIPASE SERPL-CCNC: 19 U/L (ref 13–60)
LYMPHOCYTES NFR BLD: 2.6 K/UL (ref 1.2–5.2)
LYMPHOCYTES RELATIVE PERCENT: 36 % (ref 25–45)
MCH RBC QN AUTO: 30.5 PG (ref 26–34)
MCHC RBC AUTO-ENTMCNC: 34.8 G/DL (ref 31–37)
MCV RBC AUTO: 87.7 FL (ref 80–100)
MONOCYTES NFR BLD: 0.6 K/UL (ref 0.1–1.4)
MONOCYTES NFR BLD: 8 % (ref 2–8)
MUCOUS THREADS URNS QL MICRO: ABNORMAL
NEUTROPHILS NFR BLD: 54 % (ref 34–64)
NEUTS SEG NFR BLD: 3.9 K/UL (ref 1.8–8)
NITRITE UR QL STRIP: NEGATIVE
PH UR STRIP: 5.5 [PH] (ref 5–8)
PLATELET # BLD AUTO: 359 K/UL (ref 140–450)
PMV BLD AUTO: 8.6 FL (ref 6–12)
POTASSIUM SERPL-SCNC: 3.7 MMOL/L (ref 3.7–5.3)
PROT SERPL-MCNC: 7.2 G/DL (ref 6.4–8.3)
PROT UR STRIP-MCNC: ABNORMAL MG/DL
RBC # BLD AUTO: 5.24 M/UL (ref 4.5–5.9)
RBC #/AREA URNS HPF: ABNORMAL /HPF (ref 0–2)
SODIUM SERPL-SCNC: 137 MMOL/L (ref 135–144)
SP GR UR STRIP: 1.03 (ref 1–1.03)
UROBILINOGEN UR STRIP-ACNC: NORMAL EU/DL (ref 0–1)
WBC #/AREA URNS HPF: ABNORMAL /HPF (ref 0–5)
WBC OTHER # BLD: 7.2 K/UL (ref 4.5–13.5)

## 2024-02-29 PROCEDURE — 80076 HEPATIC FUNCTION PANEL: CPT

## 2024-02-29 PROCEDURE — 99284 EMERGENCY DEPT VISIT MOD MDM: CPT

## 2024-02-29 PROCEDURE — 76705 ECHO EXAM OF ABDOMEN: CPT

## 2024-02-29 PROCEDURE — 36415 COLL VENOUS BLD VENIPUNCTURE: CPT

## 2024-02-29 PROCEDURE — 80048 BASIC METABOLIC PNL TOTAL CA: CPT

## 2024-02-29 PROCEDURE — 85025 COMPLETE CBC W/AUTO DIFF WBC: CPT

## 2024-02-29 PROCEDURE — 81001 URINALYSIS AUTO W/SCOPE: CPT

## 2024-02-29 PROCEDURE — 83690 ASSAY OF LIPASE: CPT

## 2024-02-29 PROCEDURE — 82150 ASSAY OF AMYLASE: CPT

## 2024-02-29 RX ORDER — CITALOPRAM 20 MG/1
20 TABLET ORAL DAILY
COMMUNITY

## 2024-02-29 ASSESSMENT — PAIN SCALES - GENERAL
PAINLEVEL_OUTOF10: 0
PAINLEVEL_OUTOF10: 0

## 2024-02-29 NOTE — ED PROVIDER NOTES
[facility]  EMERGENCY DEPARTMENT ENCOUNTER      Pt Name: Josesito Ocasio  MRN: [id number]  Birthdate 2005  Date of evaluation: 2/29/2024      CHIEF COMPLAINT       Chief Complaint   Patient presents with    Abdominal Pain     2 days ago, Midepigastric to RUQ          HISTORY OF PRESENT ILLNESS      The patient presents with intermittent mid gastric and right upper quadrant abdominal pain for the past 2 days.  He has not noted a pattern, but it is only a twinge of pain that comes and goes.  He has not had nausea or vomiting.  He has not had anything to eat or drink since last night.  He denies fever.  He has not had abdominal surgery.      REVIEW OF SYSTEMS       All systems reviewed and negative unless noted in HPI.  The patient denies fever or constitutional symptoms.    Denies vision change.   Denies any sore throat or rhinorrhea.    Denies any neck pain or stiffness.    Denies chest pain or shortness of breath.    No nausea,  vomiting or diarrhea.  Right upper quadrant and epigastric discomfort as noted in HPI.  Denies any dysuria.  Denies urinary frequency or hematuria.    Denies musculoskeletal injury or pain.   Denies any weakness, numbness or focal neurologic deficit.    Denies any skin rash or edema.    No recent psychiatric issues.   No easy bruising or bleeding.   Denies any polyuria, polydypsia or history of immunocompromise.       PAST MEDICAL HISTORY    has a past medical history of Depression and Hypertension.    SURGICAL HISTORY      has no past surgical history on file.    CURRENT MEDICATIONS       Previous Medications    CITALOPRAM (CELEXA) 20 MG TABLET    Take 1 tablet by mouth daily    IBUPROFEN (ADVIL;MOTRIN) 600 MG TABLET    Take 1 tablet by mouth every 6 hours as needed for Pain    METOPROLOL SUCCINATE (TOPROL XL) 50 MG EXTENDED RELEASE TABLET    Take 1 tablet by mouth daily       ALLERGIES     is allergic to amoxicillin, cefdinir, and

## 2024-02-29 NOTE — DISCHARGE INSTRUCTIONS
Follow a mild diet.  Drink plenty of fluids.  See your doctor to follow-up.  Return for worsening pain, fever, vomiting, or if worse in any way.    PLEASE RETURN TO THE EMERGENCY DEPARTMENT IMMEDIATELY if your symptoms worsen in anyway or in 8-12 hours if not improved for re-evaluation.  You should immediately return to the ER for symptoms such as increasing pain, bloody stool, fever, a feeling of passing out, light headed, dizziness, chest pain, shortness of breath, persistent nausea and/or vomiting, numbness or weakness to the arms or legs, coolness or color change of the arms or legs.      Take your medication as indicated and prescribed.  If you are given an antibiotic then, make sure you get the prescription filled and take the antibiotics until finished.      Please understand that at this time there is no evidence for a more serious underlying process, but that early in the process of an illness or injury, an emergency department workup can be falsely reassuring.  You should contact your family doctor within the next 24 hours for a follow up appointment    THANK YOU!!!    From [facility]    On behalf of the Emergency Department staff at [facility], I would like to thank you for giving us the opportunity to address your health care needs and concerns.    We hope that during your visit, our service was delivered in a professional and caring manner. Please keep [facility] in mind as we walk with you down the path to your own personal wellness.     Please expect an automated text message or email from us so we can ask a few questions about your health and progress. Based on your answers, a clinician may call you back to offer help and instructions.    Please understand that early in the process of an illness or injury, an emergency department workup can be falsely reassuring.  If you notice any worsening, changing or persistent symptoms please call your family doctor or